# Patient Record
Sex: MALE | Race: WHITE
[De-identification: names, ages, dates, MRNs, and addresses within clinical notes are randomized per-mention and may not be internally consistent; named-entity substitution may affect disease eponyms.]

---

## 2018-08-14 ENCOUNTER — HOSPITAL ENCOUNTER (OUTPATIENT)
Age: 78
End: 2018-08-14
Payer: MEDICARE

## 2018-08-14 DIAGNOSIS — E11.9: Primary | ICD-10-CM

## 2018-08-14 DIAGNOSIS — Z79.899: ICD-10-CM

## 2018-08-14 LAB
ALBUMIN LEVEL: 3.5 GM/DL (ref 3.4–5)
ALP ISO SERPL-ACNC: 109 U/L (ref 46–116)
ALT SERPLBLD-CCNC: 27 U/L (ref 12–78)
AST SERPL QL: 17 U/L (ref 15–37)
BILIRUB DIRECT SERPL-MCNC: 0.2 MG/DL (ref 0–0.2)
BILIRUB INDIRECT SERPL-MCNC: 0.7 MG/DL (ref 0–0.9)
BILIRUBIN,TOTAL: 0.9 MG/DL (ref 0.2–1)
CHOLEST SPEC-SCNC: 100 MG/DL (ref 140–200)
FT4I SERPL CALC-MCNC: 2.8 UG/DL (ref 5.93–13.13)
HDLC SERPL-MCNC: 29 MG/DL (ref 27–67)
PROT SERPL-MCNC: 5.8 GM/DL (ref 6.4–8.2)
T3RU NFR SERPL: 36 % (ref 31–39)
T4 (THYROXINE): 7.9 UG/DL (ref 4.7–13.3)
TRIGLYCERIDES: 119 MG/DL (ref 30–200)
TSH SERPL-ACNC: 3.29 UIU/ML (ref 0.36–3.74)

## 2018-08-14 PROCEDURE — 36415 COLL VENOUS BLD VENIPUNCTURE: CPT

## 2018-08-14 PROCEDURE — 80076 HEPATIC FUNCTION PANEL: CPT

## 2018-08-14 PROCEDURE — 84436 ASSAY OF TOTAL THYROXINE: CPT

## 2018-08-14 PROCEDURE — 84479 ASSAY OF THYROID (T3 OR T4): CPT

## 2018-08-14 PROCEDURE — 84443 ASSAY THYROID STIM HORMONE: CPT

## 2018-08-14 PROCEDURE — 80061 LIPID PANEL: CPT

## 2019-03-04 ENCOUNTER — ON CAMPUS - OUTPATIENT (OUTPATIENT)
Dept: RURAL HOSPITAL 6 | Facility: HOSPITAL | Age: 79
End: 2019-03-04

## 2019-03-04 DIAGNOSIS — Z85.038 PERSONAL HISTORY OF OTHER MALIGNANT NEOPLASM OF LARGE INTEST: ICD-10-CM

## 2019-03-04 DIAGNOSIS — D12.5 BENIGN NEOPLASM OF SIGMOID COLON: ICD-10-CM

## 2019-03-04 DIAGNOSIS — K57.90 DIVERTICULOSIS OF INTESTINE, PART UNSPECIFIED, WITHOUT PERFO: ICD-10-CM

## 2019-03-04 DIAGNOSIS — D12.3 BENIGN NEOPLASM OF TRANSVERSE COLON: ICD-10-CM

## 2019-03-04 DIAGNOSIS — D12.2 BENIGN NEOPLASM OF ASCENDING COLON: ICD-10-CM

## 2019-03-04 PROCEDURE — 45385 COLONOSCOPY W/LESION REMOVAL: CPT | Mod: PT | Performed by: INTERNAL MEDICINE

## 2020-02-10 ENCOUNTER — HOSPITAL ENCOUNTER (OUTPATIENT)
Age: 80
End: 2020-02-10
Payer: MEDICARE

## 2020-02-10 DIAGNOSIS — R97.20: Primary | ICD-10-CM

## 2020-02-10 PROCEDURE — 74176 CT ABD & PELVIS W/O CONTRAST: CPT

## 2020-02-13 ENCOUNTER — HOSPITAL ENCOUNTER (OUTPATIENT)
Age: 80
End: 2020-02-13
Payer: MEDICARE

## 2020-02-13 DIAGNOSIS — R06.00: ICD-10-CM

## 2020-02-13 DIAGNOSIS — Z95.5: ICD-10-CM

## 2020-02-13 DIAGNOSIS — I25.10: ICD-10-CM

## 2020-02-13 DIAGNOSIS — E78.5: Primary | ICD-10-CM

## 2020-02-13 DIAGNOSIS — I10: ICD-10-CM

## 2020-02-13 LAB
ANION GAP SERPL CALC-SCNC: 14.2 MEQ/L (ref 5–15)
BUN SERPL-MCNC: 18 MG/DL (ref 7–18)
CALCIUM SPEC-MCNC: 8.6 MG/DL (ref 8.5–10.1)
CHLORIDE SPEC-SCNC: 111 MMOL/L (ref 98–107)
CO2 SERPL-SCNC: 28 MMOL/L (ref 21–32)
CREAT BLD-SCNC: 0.97 MG/DL (ref 0.7–1.3)
ESTIMATED GLOMERULAR FILT RATE: 75 ML/MIN (ref 60–?)
GFR (AFRICAN AMERICAN): 91 ML/MIN (ref 60–?)
GLUCOSE: 100 MG/DL (ref 74–106)
POTASSIUM: 4.2 MMOL/L (ref 3.5–5.1)
SODIUM SPEC-SCNC: 149 MMOL/L (ref 137–145)

## 2020-02-13 PROCEDURE — 83880 ASSAY OF NATRIURETIC PEPTIDE: CPT

## 2020-02-13 PROCEDURE — 80048 BASIC METABOLIC PNL TOTAL CA: CPT

## 2020-02-13 PROCEDURE — 36415 COLL VENOUS BLD VENIPUNCTURE: CPT

## 2020-02-24 ENCOUNTER — HOSPITAL ENCOUNTER (OUTPATIENT)
Age: 80
End: 2020-02-24
Payer: MEDICARE

## 2020-02-24 DIAGNOSIS — I25.10: ICD-10-CM

## 2020-02-24 DIAGNOSIS — I10: ICD-10-CM

## 2020-02-24 DIAGNOSIS — Z95.5: ICD-10-CM

## 2020-02-24 DIAGNOSIS — Z85.46: ICD-10-CM

## 2020-02-24 DIAGNOSIS — E78.5: Primary | ICD-10-CM

## 2020-02-24 PROCEDURE — 93306 TTE W/DOPPLER COMPLETE: CPT

## 2020-02-24 PROCEDURE — 78306 BONE IMAGING WHOLE BODY: CPT

## 2020-02-24 PROCEDURE — A9503 TC99M MEDRONATE: HCPCS

## 2020-02-27 ENCOUNTER — HOSPITAL ENCOUNTER (OUTPATIENT)
Age: 80
End: 2020-02-27
Payer: MEDICARE

## 2020-02-27 DIAGNOSIS — R97.20: ICD-10-CM

## 2020-02-27 DIAGNOSIS — Z85.46: Primary | ICD-10-CM

## 2020-02-27 LAB — PROSTATE SPECIFIC AG, DIAGNOST: 12.6 NG/ML (ref 0–4)

## 2020-02-27 PROCEDURE — 36415 COLL VENOUS BLD VENIPUNCTURE: CPT

## 2020-02-27 PROCEDURE — 84153 ASSAY OF PSA TOTAL: CPT

## 2020-06-12 ENCOUNTER — HOSPITAL ENCOUNTER (OUTPATIENT)
Dept: HOSPITAL 22 - CATHLAB | Age: 80
Discharge: HOME | End: 2020-06-12
Payer: MEDICARE

## 2020-06-12 VITALS
DIASTOLIC BLOOD PRESSURE: 59 MMHG | HEART RATE: 51 BPM | RESPIRATION RATE: 17 BRPM | SYSTOLIC BLOOD PRESSURE: 115 MMHG | OXYGEN SATURATION: 95 %

## 2020-06-12 VITALS
OXYGEN SATURATION: 97 % | RESPIRATION RATE: 16 BRPM | DIASTOLIC BLOOD PRESSURE: 58 MMHG | HEART RATE: 52 BPM | SYSTOLIC BLOOD PRESSURE: 119 MMHG

## 2020-06-12 VITALS
DIASTOLIC BLOOD PRESSURE: 55 MMHG | SYSTOLIC BLOOD PRESSURE: 111 MMHG | HEART RATE: 52 BPM | RESPIRATION RATE: 16 BRPM | OXYGEN SATURATION: 95 %

## 2020-06-12 VITALS
OXYGEN SATURATION: 95 % | DIASTOLIC BLOOD PRESSURE: 58 MMHG | HEART RATE: 57 BPM | RESPIRATION RATE: 20 BRPM | SYSTOLIC BLOOD PRESSURE: 127 MMHG

## 2020-06-12 VITALS
RESPIRATION RATE: 15 BRPM | DIASTOLIC BLOOD PRESSURE: 73 MMHG | OXYGEN SATURATION: 100 % | SYSTOLIC BLOOD PRESSURE: 154 MMHG | HEART RATE: 57 BPM | TEMPERATURE: 97.5 F

## 2020-06-12 VITALS
DIASTOLIC BLOOD PRESSURE: 63 MMHG | OXYGEN SATURATION: 99 % | HEART RATE: 55 BPM | RESPIRATION RATE: 16 BRPM | SYSTOLIC BLOOD PRESSURE: 124 MMHG

## 2020-06-12 VITALS
RESPIRATION RATE: 20 BRPM | HEART RATE: 54 BPM | SYSTOLIC BLOOD PRESSURE: 118 MMHG | OXYGEN SATURATION: 100 % | DIASTOLIC BLOOD PRESSURE: 56 MMHG

## 2020-06-12 VITALS
SYSTOLIC BLOOD PRESSURE: 124 MMHG | DIASTOLIC BLOOD PRESSURE: 68 MMHG | OXYGEN SATURATION: 98 % | RESPIRATION RATE: 17 BRPM | HEART RATE: 57 BPM

## 2020-06-12 VITALS
SYSTOLIC BLOOD PRESSURE: 130 MMHG | OXYGEN SATURATION: 98 % | HEART RATE: 60 BPM | RESPIRATION RATE: 20 BRPM | DIASTOLIC BLOOD PRESSURE: 65 MMHG

## 2020-06-12 VITALS — BODY MASS INDEX: 31.3 KG/M2

## 2020-06-12 VITALS
HEART RATE: 50 BPM | RESPIRATION RATE: 16 BRPM | DIASTOLIC BLOOD PRESSURE: 60 MMHG | OXYGEN SATURATION: 96 % | SYSTOLIC BLOOD PRESSURE: 118 MMHG

## 2020-06-12 VITALS
OXYGEN SATURATION: 100 % | SYSTOLIC BLOOD PRESSURE: 116 MMHG | DIASTOLIC BLOOD PRESSURE: 58 MMHG | RESPIRATION RATE: 20 BRPM | HEART RATE: 54 BPM

## 2020-06-12 DIAGNOSIS — Z79.82: ICD-10-CM

## 2020-06-12 DIAGNOSIS — I10: ICD-10-CM

## 2020-06-12 DIAGNOSIS — I25.118: Primary | ICD-10-CM

## 2020-06-12 DIAGNOSIS — E78.5: ICD-10-CM

## 2020-06-12 DIAGNOSIS — Z79.899: ICD-10-CM

## 2020-06-12 DIAGNOSIS — Z79.02: ICD-10-CM

## 2020-06-12 DIAGNOSIS — Z95.5: ICD-10-CM

## 2020-06-12 DIAGNOSIS — Z87.891: ICD-10-CM

## 2020-06-12 LAB
ANION GAP SERPL CALC-SCNC: 9.1 MEQ/L (ref 5–15)
BUN SERPL-MCNC: 21 MG/DL (ref 9–20)
CALCIUM SPEC-MCNC: 9.2 MG/DL (ref 8.4–10.2)
CHLORIDE SPEC-SCNC: 108 MMOL/L (ref 98–107)
CO2 SERPL-SCNC: 29 MMOL/L (ref 22–30)
CREAT BLD-SCNC: 1.1 MG/DL (ref 0.66–1.25)
CREATININE CLEARANCE ESTIMATED: 72 ML/MIN (ref 50–200)
ESTIMATED GLOMERULAR FILT RATE: 65 ML/MIN (ref 60–?)
GFR (AFRICAN AMERICAN): 78 ML/MIN (ref 60–?)
GLUCOSE: 134 MG/DL (ref 74–100)
HCT VFR BLD CALC: 39.7 % (ref 42–52)
HGB BLD-MCNC: 13.8 G/DL (ref 14.1–18)
MCHC RBC-ENTMCNC: 34.6 G/DL (ref 31.8–35.4)
MCV RBC: 87.3 FL (ref 80–94)
MEAN CORPUSCULAR HEMOGLOBIN: 30.2 PG (ref 27–31.2)
PLATELET # BLD: 147 K/MM3 (ref 142–424)
POTASSIUM: 4.1 MMOL/L (ref 3.5–5.1)
RBC # BLD AUTO: 4.55 M/MM3 (ref 4.6–6.2)
SODIUM SPEC-SCNC: 142 MMOL/L (ref 136–145)
WBC # BLD AUTO: 7.7 K/MM3 (ref 4.8–10.8)

## 2020-06-12 PROCEDURE — C1769 GUIDE WIRE: HCPCS

## 2020-06-12 PROCEDURE — 85025 COMPLETE CBC W/AUTO DIFF WBC: CPT

## 2020-06-12 PROCEDURE — 99152 MOD SED SAME PHYS/QHP 5/>YRS: CPT

## 2020-06-12 PROCEDURE — 80048 BASIC METABOLIC PNL TOTAL CA: CPT

## 2020-06-12 PROCEDURE — 93458 L HRT ARTERY/VENTRICLE ANGIO: CPT

## 2020-07-09 ENCOUNTER — HOSPITAL ENCOUNTER (OUTPATIENT)
Age: 80
End: 2020-07-09
Payer: MEDICARE

## 2020-07-09 DIAGNOSIS — C61: Primary | ICD-10-CM

## 2020-07-09 LAB — PROSTATE SPECIFIC AG, DIAGNOST: 1.07 NG/ML (ref 0–4)

## 2020-07-09 PROCEDURE — 36415 COLL VENOUS BLD VENIPUNCTURE: CPT

## 2020-07-09 PROCEDURE — 84153 ASSAY OF PSA TOTAL: CPT

## 2020-07-20 ENCOUNTER — HOSPITAL ENCOUNTER (OUTPATIENT)
Age: 80
End: 2020-07-20
Payer: MEDICARE

## 2020-07-20 DIAGNOSIS — R91.1: Primary | ICD-10-CM

## 2020-07-23 ENCOUNTER — HOSPITAL ENCOUNTER (OUTPATIENT)
Age: 80
End: 2020-07-23
Payer: MEDICARE

## 2020-07-23 DIAGNOSIS — R91.1: Primary | ICD-10-CM

## 2020-07-23 PROCEDURE — 71260 CT THORAX DX C+: CPT

## 2020-08-13 ENCOUNTER — HOSPITAL ENCOUNTER (OUTPATIENT)
Age: 80
End: 2020-08-13
Payer: MEDICARE

## 2020-08-13 DIAGNOSIS — R91.8: ICD-10-CM

## 2020-08-13 DIAGNOSIS — R06.00: ICD-10-CM

## 2020-08-13 DIAGNOSIS — J42: Primary | ICD-10-CM

## 2020-08-13 PROCEDURE — 87077 CULTURE AEROBIC IDENTIFY: CPT

## 2020-08-13 PROCEDURE — 87070 CULTURE OTHR SPECIMN AEROBIC: CPT

## 2020-08-13 PROCEDURE — 87205 SMEAR GRAM STAIN: CPT

## 2020-08-18 ENCOUNTER — OFFICE VISIT (OUTPATIENT)
Dept: PULMONOLOGY | Facility: CLINIC | Age: 80
End: 2020-08-18

## 2020-08-18 ENCOUNTER — NURSE NAVIGATOR (OUTPATIENT)
Dept: ONCOLOGY | Facility: CLINIC | Age: 80
End: 2020-08-18

## 2020-08-18 VITALS
DIASTOLIC BLOOD PRESSURE: 72 MMHG | OXYGEN SATURATION: 94 % | BODY MASS INDEX: 29.12 KG/M2 | HEIGHT: 71 IN | SYSTOLIC BLOOD PRESSURE: 112 MMHG | TEMPERATURE: 97.1 F | WEIGHT: 208 LBS | HEART RATE: 84 BPM

## 2020-08-18 DIAGNOSIS — R93.89 ABNORMAL CHEST CT: Primary | ICD-10-CM

## 2020-08-18 DIAGNOSIS — R91.1 LUNG NODULE: Primary | ICD-10-CM

## 2020-08-18 DIAGNOSIS — J84.10 PULMONARY FIBROSIS (HCC): ICD-10-CM

## 2020-08-18 DIAGNOSIS — C61 PROSTATE CANCER (HCC): ICD-10-CM

## 2020-08-18 PROCEDURE — 99204 OFFICE O/P NEW MOD 45 MIN: CPT | Performed by: INTERNAL MEDICINE

## 2020-08-18 RX ORDER — CHLORAL HYDRATE 500 MG
CAPSULE ORAL
COMMUNITY

## 2020-08-18 RX ORDER — ATORVASTATIN CALCIUM 40 MG/1
40 TABLET, FILM COATED ORAL DAILY
COMMUNITY

## 2020-08-18 RX ORDER — PSYLLIUM SEED (WITH DEXTROSE)
POWDER (GRAM) ORAL DAILY
COMMUNITY

## 2020-08-18 RX ORDER — PANTOPRAZOLE SODIUM 40 MG/1
40 TABLET, DELAYED RELEASE ORAL DAILY
COMMUNITY

## 2020-08-18 RX ORDER — BRIMONIDINE TARTRATE 2 MG/ML
1 SOLUTION/ DROPS OPHTHALMIC 3 TIMES DAILY
COMMUNITY

## 2020-08-18 RX ORDER — ASPIRIN 81 MG/1
81 TABLET, CHEWABLE ORAL DAILY
COMMUNITY

## 2020-08-18 RX ORDER — METFORMIN HYDROCHLORIDE 500 MG/1
500 TABLET, EXTENDED RELEASE ORAL
COMMUNITY

## 2020-08-18 RX ORDER — BISOPROLOL FUMARATE 5 MG/1
5 TABLET, FILM COATED ORAL DAILY
COMMUNITY

## 2020-08-18 RX ORDER — CLOPIDOGREL BISULFATE 75 MG/1
75 TABLET ORAL DAILY
COMMUNITY

## 2020-08-18 RX ORDER — GABAPENTIN 300 MG/1
300 CAPSULE ORAL 3 TIMES DAILY
COMMUNITY

## 2020-08-18 RX ORDER — FAMOTIDINE 20 MG/1
20 TABLET, FILM COATED ORAL 2 TIMES DAILY
COMMUNITY

## 2020-08-18 RX ORDER — LATANOPROST 50 UG/ML
1 SOLUTION/ DROPS OPHTHALMIC NIGHTLY
COMMUNITY

## 2020-08-18 NOTE — PROGRESS NOTES
New Pulmonary Patient Office Visit      Patient Name: Gerard Sheffield    Referring Physician: Rufus Hutton,*    Chief Complaint:    Chief Complaint   Patient presents with   • Lung Mass       History of Present Illness: Gerard Sheffield is a 80 y.o. male who is here today to establish care with Pulmonary.      Patient is here for initial evaluation along with his daughter.  Patient previously has history of colon cancer many years ago status post radiation and chemotherapy, prostate cancer x2 first time he received radiation treatment and second time he received hormonal therapy as recently as few months ago.  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  Patient also has history of coronary disease status post stents 3 times.  More recent coronary angiogram was apparently in February and this time did not put any stents in.  Patient has 15-pack-year smoking history in the past.  He also worked in coal mines for 30 years.    Patient recently was found to have abnormal CT scan of the chest in February where he was found to have left lower lobe lung nodule.  Subsequently had a repeat CT scan last month and this nodule/mass lesion seems to be increasing in size.  He is referred for further evaluation.  Patient states that he does cough daily is sputum is mostly whitish to yellow.  He denies any blood in his sputum.  Patient does get short of breath with exertional activity.  He denies any fevers, chills or night sweats.  Denies any weight loss and in fact he says he has gained some weight.  Denies any exposure to sick contacts.  Denies any TB exposure.  Denies any asbestos exposure either.  Denies any frequent pneumonias or infections.    Denies any hematochezia or melena.  Denies any abdominal pain.    Subjective      Review of Systems:   Review of Systems   Constitutional: Positive for fatigue and unexpected weight gain.   HENT: Negative.    Respiratory: Positive for cough and  shortness of breath.    Cardiovascular: Negative.    Gastrointestinal: Negative.    Endocrine: Negative.    Musculoskeletal: Positive for arthralgias.   Skin: Negative.    Neurological: Negative.    Hematological: Negative.    Psychiatric/Behavioral: Negative.    All other systems reviewed and are negative.      Past Medical History:   Past Medical History:   Diagnosis Date   • Colon cancer (CMS/HCC)    • Diabetes mellitus (CMS/HCC)    • Heart disease    • Prostate cancer (CMS/HCC)        Past Surgical History:   Past Surgical History:   Procedure Laterality Date   • APPENDECTOMY     • CARDIAC SURGERY     • COLON SURGERY     • GALLBLADDER SURGERY         Family History: History reviewed. No pertinent family history.    Social History:   Social History     Socioeconomic History   • Marital status:      Spouse name: Not on file   • Number of children: Not on file   • Years of education: Not on file   • Highest education level: Not on file   Tobacco Use   • Smoking status: Former Smoker     Packs/day: 1.00     Years: 10.00     Pack years: 10.00     Types: Cigarettes     Last attempt to quit: 1968     Years since quittin.6   • Smokeless tobacco: Never Used   Substance and Sexual Activity   • Alcohol use: Never     Frequency: Never   • Drug use: Never   • Sexual activity: Defer       Medications:     Current Outpatient Medications:   •  aspirin 81 MG chewable tablet, Chew 81 mg Daily., Disp: , Rfl:   •  atorvastatin (LIPITOR) 40 MG tablet, Take 40 mg by mouth Daily., Disp: , Rfl:   •  bisoprolol (ZEBeta) 5 MG tablet, Take 5 mg by mouth Daily., Disp: , Rfl:   •  brimonidine (ALPHAGAN) 0.2 % ophthalmic solution, 1 drop 3 (Three) Times a Day., Disp: , Rfl:   •  clopidogrel (PLAVIX) 75 MG tablet, Take 75 mg by mouth Daily., Disp: , Rfl:   •  dornase alpha (PULMOZYME) 1 MG/ML nebulizer solution, Inhale 2.5 mg Daily., Disp: , Rfl:   •  famotidine (PEPCID) 20 MG tablet, Take 20 mg by mouth 2 (Two) Times a Day.,  "Disp: , Rfl:   •  gabapentin (NEURONTIN) 300 MG capsule, Take 300 mg by mouth 3 (Three) Times a Day., Disp: , Rfl:   •  latanoprost (XALATAN) 0.005 % ophthalmic solution, 1 drop Every Night., Disp: , Rfl:   •  metFORMIN ER (GLUCOPHAGE-XR) 500 MG 24 hr tablet, Take 500 mg by mouth Daily With Breakfast., Disp: , Rfl:   •  Omega-3 1000 MG capsule, Take  by mouth., Disp: , Rfl:   •  pantoprazole (PROTONIX) 40 MG EC tablet, Take 40 mg by mouth Daily., Disp: , Rfl:   •  Probiotic Product (PROBIOTIC-10 PO), Take  by mouth., Disp: , Rfl:   •  Psyllium (METAMUCIL) wafer wafer, Take  by mouth Daily., Disp: , Rfl:     Allergies:   No Known Allergies    Objective     Physical Exam:  Vital Signs:   Vitals:    08/18/20 1431   BP: 112/72   Pulse: 84   Temp: 97.1 °F (36.2 °C)   SpO2: 94%  Comment: resting at room air   Weight: 94.3 kg (208 lb)   Height: 180.3 cm (71\")       Physical Exam   Constitutional: He is oriented to person, place, and time. He appears well-developed and well-nourished. No distress.   HENT:   Head: Normocephalic and atraumatic.   Nose: Nose normal.   Mouth/Throat: Oropharynx is clear and moist. No oropharyngeal exudate.   Thrush: None  Mallampati Score: 3    Eyes: Pupils are equal, round, and reactive to light. EOM are normal. Right eye exhibits no discharge. Left eye exhibits no discharge. No scleral icterus.   Neck: Neck supple. No tracheal deviation present. No thyromegaly present.   Cardiovascular: Normal rate, regular rhythm and normal heart sounds. Exam reveals no friction rub.   No murmur heard.  Pulmonary/Chest: Effort normal. No stridor. No respiratory distress. He has no wheezes. He has rales (bibasilar insp rales).   Abdominal: Soft. He exhibits no distension. There is no tenderness.   Musculoskeletal: He exhibits no edema or tenderness.   Clubbing: none   Lymphadenopathy:     He has no cervical adenopathy.   Neurological: He is alert and oriented to person, place, and time. No cranial nerve " deficit. Coordination normal.   Skin: He is not diaphoretic.   Psychiatric: He has a normal mood and affect. His behavior is normal. Judgment and thought content normal.   Nursing note and vitals reviewed.      Results Review:   Radiology report reviewed.  Actual films are not available for my review.  Looking at the report patient does have some pulmonary fibrotic changes.  Left lower lobe soft tissue mass is noted close to diaphragm where it is measuring 4.2 x 3.7 x 2.8.  Previously it was 3.7 x 3.4 x 3 cm in February 2020.      Assessment / Plan      Assessment:   Problem List Items Addressed This Visit     None      Visit Diagnoses     Abnormal chest CT    -  Primary    Pulmonary fibrosis (CMS/HCC)        Relevant Medications    dornase alpha (PULMOZYME) 1 MG/ML nebulizer solution          Plan:   1.  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  Patient states that he has been dealing with lung nodules for long time and recently they have noticed that they are increasing in size.  After our meeting was almost over he told me that he already saw pulmonologist in [place].  He was quite upset that he is seeing a pulmonologist again.  He and his daughter were under the impression that patient will be undergoing a PET scan today.  Discussed that we do not have that facility in our office but we will have to arrange for that.  Discussed the limitations of doing specialized imaging on the day of visit.  He told me that he did not even want to see another pulmonologist since he had seen 1.  We will try to figure out what happened here.    2.  Discussed with him that unfortunately I do not have his scans available for my review either.  I will need to request those from [facility] and once I review those I will be in touch with him about next steps.  In the meantime we will arrange for a PET scan to be done.  Discussed that PET scan does not tell us if the  lesion we are looking at is cancerous or infectious but it can guide us in that way.  Discussed that negative PET scan may be more meaningful than positive.    3.  Given this patient's history underlying ligament process is highly concerning.  If we need to proceed with bronchoscopy he will need to get off Plavix for 5 days at least and we will try to arrange that with his primary care physician or cardiologist.    4.  Patient does have inspiratory crackles and on CT scan does seem to have pulmonary fibrosis.  We will review the scans and see if he has any component of UIP and if he will benefit from newer anti-fibrotic treatment.    5.  Patient will need PFTs and will try to arrange for those when he comes back for follow-up.        Thank you for allowing me to participate in the care of this patient.  We will follow him closely with you.  There was some misunderstanding between his referral and he will try to discuss with his primary care provider regarding that.    Follow Up:   Will arrange PET scan and will call with report.    Discussed plan of care in detail with patient today. Patient verbally understands and agrees.      Jesse Velez MD  Pulmonary Critical Care and Sleep Medicine      Please note that portions of this note may have been completed with a voice recognition program. Efforts were made to edit the dictations, but occasionally words are mistranscribed.

## 2020-08-19 DIAGNOSIS — R93.89 ABNORMAL CHEST CT: Primary | ICD-10-CM

## 2020-08-19 NOTE — PROGRESS NOTES
Met patient and daughter in lung nodule clinic with Dr. Velez. He has remote smoking history, quitting in 1968. 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 He has history of prostate cancer, colon cancer and leukemia in his teens. He presented with chest pain in July and CT scan of the chest for that work-up incidentally noted 4.2cm LLL mass with left diaphragm thickening. Disc with images was not available at time of visit. Disc will be requested for comparison. He was seen by pulmonary doctor in [place] and referred here by that physician. Patient was under the impression he was coming to [place] today for PET scan and became upset when he discovered he would not have that done today. Per Dr. Velez PET now. Introduced lung navigator role and provided contact information. He v/u and agreeable to plan.

## 2020-08-21 DIAGNOSIS — R91.1 LUNG NODULE: Primary | ICD-10-CM

## 2020-08-25 DIAGNOSIS — Z00.6 EXAMINATION FOR NORMAL COMPARISON FOR CLINICAL RESEARCH: Primary | ICD-10-CM

## 2020-09-03 ENCOUNTER — APPOINTMENT (OUTPATIENT)
Dept: PET IMAGING | Facility: HOSPITAL | Age: 80
End: 2020-09-03

## 2020-09-03 ENCOUNTER — HOSPITAL ENCOUNTER (OUTPATIENT)
Dept: PET IMAGING | Facility: HOSPITAL | Age: 80
Discharge: HOME | End: 2020-09-03

## 2020-09-03 ENCOUNTER — HOSPITAL ENCOUNTER (OUTPATIENT)
Dept: PET IMAGING | Facility: HOSPITAL | Age: 80
Discharge: HOME | End: 2020-09-03
Admitting: INTERNAL MEDICINE

## 2020-09-03 DIAGNOSIS — R91.1 LUNG NODULE: ICD-10-CM

## 2020-09-03 LAB — GLUCOSE BLDC GLUCOMTR-MCNC: 110 MG/DL (ref 70–130)

## 2020-09-03 PROCEDURE — 0 FLUDEOXYGLUCOSE F18 SOLUTION: Performed by: INTERNAL MEDICINE

## 2020-09-03 PROCEDURE — 82962 GLUCOSE BLOOD TEST: CPT

## 2020-09-03 PROCEDURE — 78815 PET IMAGE W/CT SKULL-THIGH: CPT

## 2020-09-03 PROCEDURE — A9552 F18 FDG: HCPCS | Performed by: INTERNAL MEDICINE

## 2020-09-03 RX ADMIN — FLUDEOXYGLUCOSE F18 1 DOSE: 300 INJECTION INTRAVENOUS at 11:42

## 2020-10-12 ENCOUNTER — HOSPITAL ENCOUNTER (OUTPATIENT)
Age: 80
End: 2020-10-12
Payer: MEDICARE

## 2020-10-12 DIAGNOSIS — C61: Primary | ICD-10-CM

## 2020-10-12 LAB — PROSTATE SPECIFIC AG, DIAGNOST: 0.54 NG/ML (ref 0–4)

## 2020-10-12 PROCEDURE — 84153 ASSAY OF PSA TOTAL: CPT

## 2020-10-12 PROCEDURE — 36415 COLL VENOUS BLD VENIPUNCTURE: CPT

## 2021-04-12 ENCOUNTER — HOSPITAL ENCOUNTER (OUTPATIENT)
Age: 81
End: 2021-04-12
Payer: MEDICARE

## 2021-04-12 DIAGNOSIS — C61: Primary | ICD-10-CM

## 2021-04-12 LAB — PROSTATE SPECIFIC AG, DIAGNOST: 0.28 NG/ML (ref 0–4)

## 2021-04-12 PROCEDURE — 84153 ASSAY OF PSA TOTAL: CPT

## 2021-07-12 ENCOUNTER — HOSPITAL ENCOUNTER (OUTPATIENT)
Age: 81
End: 2021-07-12
Payer: MEDICARE

## 2021-07-12 DIAGNOSIS — R91.1: Primary | ICD-10-CM

## 2021-07-12 LAB
BUN SERPL-MCNC: 21 MG/DL (ref 9–20)
CREAT BLD-SCNC: 1 MG/DL (ref 0.66–1.25)
ESTIMATED GLOMERULAR FILT RATE: 72 ML/MIN (ref 60–?)
GFR (AFRICAN AMERICAN): 87 ML/MIN (ref 60–?)

## 2021-07-12 PROCEDURE — 71260 CT THORAX DX C+: CPT

## 2021-07-12 PROCEDURE — 84520 ASSAY OF UREA NITROGEN: CPT

## 2021-07-12 PROCEDURE — 36415 COLL VENOUS BLD VENIPUNCTURE: CPT

## 2021-07-12 PROCEDURE — 82565 ASSAY OF CREATININE: CPT

## 2021-10-12 ENCOUNTER — HOSPITAL ENCOUNTER (OUTPATIENT)
Age: 81
End: 2021-10-12
Payer: MEDICARE

## 2021-10-12 DIAGNOSIS — C61: Primary | ICD-10-CM

## 2021-10-12 LAB — PROSTATE SPECIFIC AG, DIAGNOST: 0.39 NG/ML (ref 0–4)

## 2021-10-12 PROCEDURE — 36415 COLL VENOUS BLD VENIPUNCTURE: CPT

## 2021-10-12 PROCEDURE — 84153 ASSAY OF PSA TOTAL: CPT

## 2021-11-21 ENCOUNTER — HOSPITAL ENCOUNTER (INPATIENT)
Dept: HOSPITAL 22 - ER | Age: 81
LOS: 8 days | Discharge: HOME HEALTH | DRG: 177 | End: 2021-11-29
Payer: MEDICARE

## 2021-11-21 VITALS
DIASTOLIC BLOOD PRESSURE: 51 MMHG | RESPIRATION RATE: 32 BRPM | SYSTOLIC BLOOD PRESSURE: 133 MMHG | OXYGEN SATURATION: 95 % | HEART RATE: 94 BPM

## 2021-11-21 VITALS
TEMPERATURE: 98.96 F | HEART RATE: 74 BPM | DIASTOLIC BLOOD PRESSURE: 71 MMHG | SYSTOLIC BLOOD PRESSURE: 127 MMHG | RESPIRATION RATE: 20 BRPM | OXYGEN SATURATION: 96 %

## 2021-11-21 VITALS
OXYGEN SATURATION: 96 % | RESPIRATION RATE: 20 BRPM | HEART RATE: 80 BPM | DIASTOLIC BLOOD PRESSURE: 43 MMHG | SYSTOLIC BLOOD PRESSURE: 136 MMHG | TEMPERATURE: 99 F

## 2021-11-21 VITALS
BODY MASS INDEX: 30.1 KG/M2 | BODY MASS INDEX: 30.2 KG/M2 | BODY MASS INDEX: 30.7 KG/M2 | BODY MASS INDEX: 28 KG/M2 | BODY MASS INDEX: 29.2 KG/M2 | BODY MASS INDEX: 30.4 KG/M2 | BODY MASS INDEX: 29.1 KG/M2

## 2021-11-21 VITALS
SYSTOLIC BLOOD PRESSURE: 129 MMHG | RESPIRATION RATE: 28 BRPM | HEART RATE: 95 BPM | DIASTOLIC BLOOD PRESSURE: 59 MMHG | OXYGEN SATURATION: 87 %

## 2021-11-21 VITALS
OXYGEN SATURATION: 96 % | HEART RATE: 78 BPM | RESPIRATION RATE: 28 BRPM | SYSTOLIC BLOOD PRESSURE: 142 MMHG | TEMPERATURE: 98 F | DIASTOLIC BLOOD PRESSURE: 78 MMHG

## 2021-11-21 VITALS
DIASTOLIC BLOOD PRESSURE: 68 MMHG | RESPIRATION RATE: 24 BRPM | OXYGEN SATURATION: 96 % | TEMPERATURE: 99.86 F | SYSTOLIC BLOOD PRESSURE: 124 MMHG | HEART RATE: 88 BPM

## 2021-11-21 VITALS
SYSTOLIC BLOOD PRESSURE: 127 MMHG | HEART RATE: 104 BPM | RESPIRATION RATE: 28 BRPM | DIASTOLIC BLOOD PRESSURE: 63 MMHG | TEMPERATURE: 99.2 F | OXYGEN SATURATION: 71 %

## 2021-11-21 VITALS — OXYGEN SATURATION: 94 %

## 2021-11-21 VITALS
DIASTOLIC BLOOD PRESSURE: 57 MMHG | RESPIRATION RATE: 20 BRPM | SYSTOLIC BLOOD PRESSURE: 133 MMHG | OXYGEN SATURATION: 94 % | TEMPERATURE: 99.1 F | HEART RATE: 90 BPM

## 2021-11-21 VITALS — OXYGEN SATURATION: 96 %

## 2021-11-21 VITALS — HEART RATE: 80 BPM

## 2021-11-21 DIAGNOSIS — I25.10: ICD-10-CM

## 2021-11-21 DIAGNOSIS — J12.82: ICD-10-CM

## 2021-11-21 DIAGNOSIS — U07.1: Primary | ICD-10-CM

## 2021-11-21 DIAGNOSIS — J44.0: ICD-10-CM

## 2021-11-21 DIAGNOSIS — Z95.5: ICD-10-CM

## 2021-11-21 DIAGNOSIS — E78.2: ICD-10-CM

## 2021-11-21 DIAGNOSIS — J96.01: ICD-10-CM

## 2021-11-21 DIAGNOSIS — Z79.84: ICD-10-CM

## 2021-11-21 DIAGNOSIS — Z87.891: ICD-10-CM

## 2021-11-21 DIAGNOSIS — E11.9: ICD-10-CM

## 2021-11-21 DIAGNOSIS — R91.8: ICD-10-CM

## 2021-11-21 DIAGNOSIS — I25.2: ICD-10-CM

## 2021-11-21 DIAGNOSIS — M19.90: ICD-10-CM

## 2021-11-21 DIAGNOSIS — I10: ICD-10-CM

## 2021-11-21 LAB
ALBUMIN LEVEL: 3.8 G/DL (ref 3.5–5)
ALBUMIN/GLOB SERPL: 1.3 {RATIO} (ref 1.1–1.8)
ALP ISO SERPL-ACNC: 103 U/L (ref 38–126)
ALT SERPLBLD-CCNC: 40 U/L (ref 12–78)
ANION GAP SERPL CALC-SCNC: 11.8 MEQ/L (ref 5–15)
AST SERPL QL: 75 U/L (ref 17–59)
BILIRUBIN,TOTAL: 1.1 MG/DL (ref 0.2–1.3)
BUN SERPL-MCNC: 26 MG/DL (ref 9–20)
CALCIUM SPEC-MCNC: 8.9 MG/DL (ref 8.4–10.2)
CHLORIDE SPEC-SCNC: 103 MMOL/L (ref 98–107)
CK SERPL-CCNC: 303 U/L (ref 55–170)
CO2 SERPL-SCNC: 27 MMOL/L (ref 22–30)
CREAT BLD-SCNC: 1.4 MG/DL (ref 0.66–1.25)
CREATININE CLEARANCE ESTIMATED: 59 ML/MIN (ref 50–200)
CRP SERPL HS-MCNC: 303.4 MG/L (ref 0–4)
D-DIMER: 0.93 UG/ML (ref 0–0.5)
ESTIMATED GLOMERULAR FILT RATE: 49 ML/MIN (ref 60–?)
GFR (AFRICAN AMERICAN): 59 ML/MIN (ref 60–?)
GLOBULIN SER CALC-MCNC: 3 G/DL (ref 1.3–3.2)
GLUCOSE BLDC GLUCOMTR-MCNC: 257 MG/DL (ref 70–110)
GLUCOSE BLDC GLUCOMTR-MCNC: 275 MG/DL (ref 70–110)
GLUCOSE: 264 MG/DL (ref 74–100)
HCT VFR BLD CALC: 37.9 % (ref 42–52)
HGB BLD-MCNC: 12.2 G/DL (ref 14.1–18)
LACTATE SERPL-MCNC: 1.6 MMOL/L (ref 0.7–2.1)
MCHC RBC-ENTMCNC: 32.1 G/DL (ref 31.8–35.4)
MCV RBC: 89.2 FL (ref 80–94)
MEAN CORPUSCULAR HEMOGLOBIN: 28.6 PG (ref 27–31.2)
PLATELET # BLD: 221 K/MM3 (ref 142–424)
POTASSIUM: 4.8 MMOL/L (ref 3.5–5.1)
PROT SERPL-MCNC: 6.8 G/DL (ref 6.3–8.2)
RBC # BLD AUTO: 4.25 M/MM3 (ref 4.6–6.2)
REFLEX LACTIC: (no result)
SODIUM SPEC-SCNC: 137 MMOL/L (ref 136–145)
TROPONIN I: 0.02 NG/ML (ref 0–0.03)
WBC # BLD AUTO: 5 K/MM3 (ref 4.8–10.8)

## 2021-11-21 PROCEDURE — 80053 COMPREHEN METABOLIC PANEL: CPT

## 2021-11-21 PROCEDURE — 85378 FIBRIN DEGRADE SEMIQUANT: CPT

## 2021-11-21 PROCEDURE — 99285 EMERGENCY DEPT VISIT HI MDM: CPT

## 2021-11-21 PROCEDURE — 82962 GLUCOSE BLOOD TEST: CPT

## 2021-11-21 PROCEDURE — 87040 BLOOD CULTURE FOR BACTERIA: CPT

## 2021-11-21 PROCEDURE — C9803 HOPD COVID-19 SPEC COLLECT: HCPCS

## 2021-11-21 PROCEDURE — 86140 C-REACTIVE PROTEIN: CPT

## 2021-11-21 PROCEDURE — U0005 INFEC AGEN DETEC AMPLI PROBE: HCPCS

## 2021-11-21 PROCEDURE — 85025 COMPLETE CBC W/AUTO DIFF WBC: CPT

## 2021-11-21 PROCEDURE — 36415 COLL VENOUS BLD VENIPUNCTURE: CPT

## 2021-11-21 PROCEDURE — 71275 CT ANGIOGRAPHY CHEST: CPT

## 2021-11-21 PROCEDURE — 96365 THER/PROPH/DIAG IV INF INIT: CPT

## 2021-11-21 PROCEDURE — 71045 X-RAY EXAM CHEST 1 VIEW: CPT

## 2021-11-21 PROCEDURE — 83615 LACTATE (LD) (LDH) ENZYME: CPT

## 2021-11-21 PROCEDURE — 93005 ELECTROCARDIOGRAM TRACING: CPT

## 2021-11-21 PROCEDURE — 86328 IA NFCT AB SARSCOV2 COVID19: CPT

## 2021-11-21 PROCEDURE — U0003 INFECTIOUS AGENT DETECTION BY NUCLEIC ACID (DNA OR RNA); SEVERE ACUTE RESPIRATORY SYNDROME CORONAVIRUS 2 (SARS-COV-2) (CORONAVIRUS DISEASE [COVID-19]), AMPLIFIED PROBE TECHNIQUE, MAKING USE OF HIGH THROUGHPUT TECHNOLOGIES AS DESCRIBED BY CMS-2020-01-R: HCPCS

## 2021-11-21 PROCEDURE — 94640 AIRWAY INHALATION TREATMENT: CPT

## 2021-11-21 PROCEDURE — 96375 TX/PRO/DX INJ NEW DRUG ADDON: CPT

## 2021-11-21 PROCEDURE — 94761 N-INVAS EAR/PLS OXIMETRY MLT: CPT

## 2021-11-21 PROCEDURE — 87205 SMEAR GRAM STAIN: CPT

## 2021-11-21 PROCEDURE — 87070 CULTURE OTHR SPECIMN AEROBIC: CPT

## 2021-11-21 PROCEDURE — 82550 ASSAY OF CK (CPK): CPT

## 2021-11-21 PROCEDURE — 83605 ASSAY OF LACTIC ACID: CPT

## 2021-11-21 PROCEDURE — 84484 ASSAY OF TROPONIN QUANT: CPT

## 2021-11-22 VITALS — HEART RATE: 74 BPM | OXYGEN SATURATION: 97 %

## 2021-11-22 VITALS
HEART RATE: 72 BPM | SYSTOLIC BLOOD PRESSURE: 134 MMHG | OXYGEN SATURATION: 97 % | DIASTOLIC BLOOD PRESSURE: 60 MMHG | TEMPERATURE: 98 F | RESPIRATION RATE: 22 BRPM

## 2021-11-22 VITALS
DIASTOLIC BLOOD PRESSURE: 54 MMHG | HEART RATE: 80 BPM | RESPIRATION RATE: 20 BRPM | TEMPERATURE: 98.9 F | SYSTOLIC BLOOD PRESSURE: 127 MMHG | OXYGEN SATURATION: 95 %

## 2021-11-22 VITALS — OXYGEN SATURATION: 92 % | HEART RATE: 77 BPM

## 2021-11-22 VITALS
OXYGEN SATURATION: 95 % | TEMPERATURE: 97.88 F | RESPIRATION RATE: 20 BRPM | SYSTOLIC BLOOD PRESSURE: 147 MMHG | HEART RATE: 78 BPM | DIASTOLIC BLOOD PRESSURE: 72 MMHG

## 2021-11-22 VITALS
OXYGEN SATURATION: 96 % | HEART RATE: 60 BPM | TEMPERATURE: 99 F | DIASTOLIC BLOOD PRESSURE: 68 MMHG | RESPIRATION RATE: 20 BRPM | SYSTOLIC BLOOD PRESSURE: 130 MMHG

## 2021-11-22 VITALS — HEART RATE: 77 BPM

## 2021-11-22 VITALS — HEART RATE: 74 BPM

## 2021-11-22 VITALS
HEART RATE: 72 BPM | SYSTOLIC BLOOD PRESSURE: 156 MMHG | OXYGEN SATURATION: 94 % | RESPIRATION RATE: 20 BRPM | DIASTOLIC BLOOD PRESSURE: 81 MMHG | TEMPERATURE: 98.4 F

## 2021-11-22 VITALS — HEART RATE: 70 BPM

## 2021-11-22 VITALS — OXYGEN SATURATION: 96 %

## 2021-11-22 LAB
ALBUMIN LEVEL: 3.2 G/DL (ref 3.5–5)
ALBUMIN/GLOB SERPL: 1.2 {RATIO} (ref 1.1–1.8)
ALP ISO SERPL-ACNC: 84 U/L (ref 38–126)
ALT SERPLBLD-CCNC: 35 U/L (ref 12–78)
ANION GAP SERPL CALC-SCNC: 8.6 MEQ/L (ref 5–15)
AST SERPL QL: 70 U/L (ref 17–59)
BILIRUBIN,TOTAL: 0.6 MG/DL (ref 0.2–1.3)
BUN SERPL-MCNC: 26 MG/DL (ref 9–20)
CALCIUM SPEC-MCNC: 8.6 MG/DL (ref 8.4–10.2)
CHLORIDE SPEC-SCNC: 108 MMOL/L (ref 98–107)
CO2 SERPL-SCNC: 29 MMOL/L (ref 22–30)
CREAT BLD-SCNC: 1.1 MG/DL (ref 0.66–1.25)
CREATININE CLEARANCE ESTIMATED: 75 ML/MIN (ref 50–200)
ESTIMATED GLOMERULAR FILT RATE: 64 ML/MIN (ref 60–?)
GFR (AFRICAN AMERICAN): 78 ML/MIN (ref 60–?)
GLOBULIN SER CALC-MCNC: 2.7 G/DL (ref 1.3–3.2)
GLUCOSE BLDC GLUCOMTR-MCNC: 205 MG/DL (ref 70–110)
GLUCOSE: 223 MG/DL (ref 74–100)
POTASSIUM: 4.6 MMOL/L (ref 3.5–5.1)
PROT SERPL-MCNC: 5.9 G/DL (ref 6.3–8.2)
SODIUM SPEC-SCNC: 141 MMOL/L (ref 136–145)

## 2021-11-23 VITALS
DIASTOLIC BLOOD PRESSURE: 66 MMHG | OXYGEN SATURATION: 94 % | SYSTOLIC BLOOD PRESSURE: 144 MMHG | TEMPERATURE: 98.3 F | HEART RATE: 71 BPM | RESPIRATION RATE: 22 BRPM

## 2021-11-23 VITALS
OXYGEN SATURATION: 92 % | HEART RATE: 74 BPM | RESPIRATION RATE: 22 BRPM | SYSTOLIC BLOOD PRESSURE: 146 MMHG | TEMPERATURE: 97.4 F | DIASTOLIC BLOOD PRESSURE: 79 MMHG

## 2021-11-23 VITALS — OXYGEN SATURATION: 90 % | HEART RATE: 89 BPM

## 2021-11-23 VITALS
SYSTOLIC BLOOD PRESSURE: 133 MMHG | DIASTOLIC BLOOD PRESSURE: 73 MMHG | RESPIRATION RATE: 20 BRPM | HEART RATE: 76 BPM | TEMPERATURE: 97.9 F | OXYGEN SATURATION: 91 %

## 2021-11-23 VITALS
HEART RATE: 75 BPM | RESPIRATION RATE: 18 BRPM | OXYGEN SATURATION: 96 % | DIASTOLIC BLOOD PRESSURE: 69 MMHG | TEMPERATURE: 97.52 F | SYSTOLIC BLOOD PRESSURE: 130 MMHG

## 2021-11-23 VITALS — HEART RATE: 70 BPM | OXYGEN SATURATION: 94 %

## 2021-11-23 VITALS
TEMPERATURE: 98.42 F | RESPIRATION RATE: 20 BRPM | SYSTOLIC BLOOD PRESSURE: 120 MMHG | HEART RATE: 74 BPM | DIASTOLIC BLOOD PRESSURE: 60 MMHG | OXYGEN SATURATION: 96 %

## 2021-11-23 VITALS
SYSTOLIC BLOOD PRESSURE: 133 MMHG | DIASTOLIC BLOOD PRESSURE: 71 MMHG | TEMPERATURE: 97.3 F | HEART RATE: 74 BPM | OXYGEN SATURATION: 91 % | RESPIRATION RATE: 20 BRPM

## 2021-11-23 VITALS — OXYGEN SATURATION: 94 % | HEART RATE: 76 BPM

## 2021-11-23 VITALS — HEART RATE: 72 BPM | OXYGEN SATURATION: 94 %

## 2021-11-23 VITALS
TEMPERATURE: 98 F | DIASTOLIC BLOOD PRESSURE: 73 MMHG | OXYGEN SATURATION: 92 % | HEART RATE: 78 BPM | RESPIRATION RATE: 18 BRPM | SYSTOLIC BLOOD PRESSURE: 132 MMHG

## 2021-11-23 VITALS — HEART RATE: 86 BPM | OXYGEN SATURATION: 92 %

## 2021-11-23 LAB
ALBUMIN LEVEL: 3 G/DL (ref 3.5–5)
ALBUMIN/GLOB SERPL: 1.1 {RATIO} (ref 1.1–1.8)
ALP ISO SERPL-ACNC: 84 U/L (ref 38–126)
ALT SERPLBLD-CCNC: 39 U/L (ref 12–78)
ANION GAP SERPL CALC-SCNC: 11.6 MEQ/L (ref 5–15)
AST SERPL QL: 65 U/L (ref 17–59)
BILIRUBIN,TOTAL: 0.6 MG/DL (ref 0.2–1.3)
BUN SERPL-MCNC: 25 MG/DL (ref 9–20)
CALCIUM SPEC-MCNC: 8.7 MG/DL (ref 8.4–10.2)
CHLORIDE SPEC-SCNC: 111 MMOL/L (ref 98–107)
CO2 SERPL-SCNC: 24 MMOL/L (ref 22–30)
CREAT BLD-SCNC: 1 MG/DL (ref 0.66–1.25)
CREATININE CLEARANCE ESTIMATED: 82 ML/MIN (ref 50–200)
ESTIMATED GLOMERULAR FILT RATE: 72 ML/MIN (ref 60–?)
GFR (AFRICAN AMERICAN): 87 ML/MIN (ref 60–?)
GLOBULIN SER CALC-MCNC: 2.7 G/DL (ref 1.3–3.2)
GLUCOSE BLDC GLUCOMTR-MCNC: 226 MG/DL (ref 70–110)
GLUCOSE BLDC GLUCOMTR-MCNC: 229 MG/DL (ref 70–110)
GLUCOSE BLDC GLUCOMTR-MCNC: 252 MG/DL (ref 70–110)
GLUCOSE BLDC GLUCOMTR-MCNC: 263 MG/DL (ref 70–110)
GLUCOSE: 221 MG/DL (ref 74–100)
POTASSIUM: 4.6 MMOL/L (ref 3.5–5.1)
PROT SERPL-MCNC: 5.7 G/DL (ref 6.3–8.2)
SODIUM SPEC-SCNC: 142 MMOL/L (ref 136–145)

## 2021-11-24 VITALS
SYSTOLIC BLOOD PRESSURE: 118 MMHG | DIASTOLIC BLOOD PRESSURE: 60 MMHG | HEART RATE: 72 BPM | TEMPERATURE: 98.42 F | OXYGEN SATURATION: 89 % | RESPIRATION RATE: 21 BRPM

## 2021-11-24 VITALS — OXYGEN SATURATION: 91 % | HEART RATE: 79 BPM

## 2021-11-24 VITALS
SYSTOLIC BLOOD PRESSURE: 121 MMHG | HEART RATE: 70 BPM | OXYGEN SATURATION: 88 % | RESPIRATION RATE: 19 BRPM | TEMPERATURE: 98.06 F | DIASTOLIC BLOOD PRESSURE: 63 MMHG

## 2021-11-24 VITALS
RESPIRATION RATE: 20 BRPM | OXYGEN SATURATION: 90 % | HEART RATE: 64 BPM | TEMPERATURE: 98.2 F | DIASTOLIC BLOOD PRESSURE: 60 MMHG | SYSTOLIC BLOOD PRESSURE: 122 MMHG

## 2021-11-24 VITALS — OXYGEN SATURATION: 95 % | HEART RATE: 64 BPM

## 2021-11-24 VITALS
HEART RATE: 80 BPM | SYSTOLIC BLOOD PRESSURE: 136 MMHG | RESPIRATION RATE: 18 BRPM | OXYGEN SATURATION: 91 % | TEMPERATURE: 98.24 F | DIASTOLIC BLOOD PRESSURE: 57 MMHG

## 2021-11-24 VITALS
OXYGEN SATURATION: 92 % | HEART RATE: 61 BPM | DIASTOLIC BLOOD PRESSURE: 66 MMHG | RESPIRATION RATE: 19 BRPM | TEMPERATURE: 98.42 F | SYSTOLIC BLOOD PRESSURE: 138 MMHG

## 2021-11-24 VITALS — HEART RATE: 60 BPM

## 2021-11-24 VITALS — HEART RATE: 63 BPM | OXYGEN SATURATION: 92 %

## 2021-11-24 VITALS — HEART RATE: 66 BPM

## 2021-11-24 LAB
ALBUMIN LEVEL: 2.7 G/DL (ref 3.5–5)
ALBUMIN/GLOB SERPL: 1 {RATIO} (ref 1.1–1.8)
ALP ISO SERPL-ACNC: 70 U/L (ref 38–126)
ALT SERPLBLD-CCNC: 33 U/L (ref 12–78)
ANION GAP SERPL CALC-SCNC: 7.5 MEQ/L (ref 5–15)
AST SERPL QL: 52 U/L (ref 17–59)
BILIRUBIN,TOTAL: 0.5 MG/DL (ref 0.2–1.3)
BUN SERPL-MCNC: 26 MG/DL (ref 9–20)
CALCIUM SPEC-MCNC: 8.4 MG/DL (ref 8.4–10.2)
CHLORIDE SPEC-SCNC: 111 MMOL/L (ref 98–107)
CO2 SERPL-SCNC: 27 MMOL/L (ref 22–30)
CREAT BLD-SCNC: 1 MG/DL (ref 0.66–1.25)
CREATININE CLEARANCE ESTIMATED: 82 ML/MIN (ref 50–200)
ESTIMATED GLOMERULAR FILT RATE: 72 ML/MIN (ref 60–?)
GFR (AFRICAN AMERICAN): 87 ML/MIN (ref 60–?)
GLOBULIN SER CALC-MCNC: 2.6 G/DL (ref 1.3–3.2)
GLUCOSE BLDC GLUCOMTR-MCNC: 166 MG/DL (ref 70–110)
GLUCOSE BLDC GLUCOMTR-MCNC: 169 MG/DL (ref 70–110)
GLUCOSE BLDC GLUCOMTR-MCNC: 195 MG/DL (ref 70–110)
GLUCOSE BLDC GLUCOMTR-MCNC: 213 MG/DL (ref 70–110)
GLUCOSE: 154 MG/DL (ref 74–100)
POTASSIUM: 4.5 MMOL/L (ref 3.5–5.1)
PROT SERPL-MCNC: 5.3 G/DL (ref 6.3–8.2)
SODIUM SPEC-SCNC: 141 MMOL/L (ref 136–145)

## 2021-11-25 VITALS
SYSTOLIC BLOOD PRESSURE: 114 MMHG | HEART RATE: 75 BPM | TEMPERATURE: 98.4 F | RESPIRATION RATE: 22 BRPM | DIASTOLIC BLOOD PRESSURE: 57 MMHG | OXYGEN SATURATION: 94 %

## 2021-11-25 VITALS — OXYGEN SATURATION: 91 % | HEART RATE: 57 BPM

## 2021-11-25 VITALS
TEMPERATURE: 98.42 F | HEART RATE: 78 BPM | OXYGEN SATURATION: 89 % | DIASTOLIC BLOOD PRESSURE: 58 MMHG | SYSTOLIC BLOOD PRESSURE: 126 MMHG | RESPIRATION RATE: 20 BRPM

## 2021-11-25 VITALS
SYSTOLIC BLOOD PRESSURE: 111 MMHG | TEMPERATURE: 97.34 F | OXYGEN SATURATION: 92 % | RESPIRATION RATE: 22 BRPM | DIASTOLIC BLOOD PRESSURE: 55 MMHG | HEART RATE: 70 BPM

## 2021-11-25 VITALS
RESPIRATION RATE: 19 BRPM | OXYGEN SATURATION: 90 % | HEART RATE: 62 BPM | SYSTOLIC BLOOD PRESSURE: 121 MMHG | DIASTOLIC BLOOD PRESSURE: 57 MMHG | TEMPERATURE: 97.8 F

## 2021-11-25 VITALS — HEART RATE: 71 BPM | OXYGEN SATURATION: 94 %

## 2021-11-25 VITALS — HEART RATE: 73 BPM | OXYGEN SATURATION: 87 %

## 2021-11-25 VITALS
HEART RATE: 50 BPM | DIASTOLIC BLOOD PRESSURE: 59 MMHG | TEMPERATURE: 98.42 F | SYSTOLIC BLOOD PRESSURE: 130 MMHG | OXYGEN SATURATION: 88 % | RESPIRATION RATE: 17 BRPM

## 2021-11-25 VITALS
SYSTOLIC BLOOD PRESSURE: 116 MMHG | DIASTOLIC BLOOD PRESSURE: 66 MMHG | OXYGEN SATURATION: 90 % | RESPIRATION RATE: 22 BRPM | HEART RATE: 64 BPM | TEMPERATURE: 98.78 F

## 2021-11-25 VITALS — HEART RATE: 50 BPM

## 2021-11-25 VITALS — HEART RATE: 71 BPM

## 2021-11-25 LAB
ALBUMIN LEVEL: 2.8 G/DL (ref 3.5–5)
ALBUMIN/GLOB SERPL: 1.1 {RATIO} (ref 1.1–1.8)
ALP ISO SERPL-ACNC: 73 U/L (ref 38–126)
ALT SERPLBLD-CCNC: 37 U/L (ref 12–78)
ANION GAP SERPL CALC-SCNC: 9.2 MEQ/L (ref 5–15)
AST SERPL QL: 52 U/L (ref 17–59)
BILIRUBIN,TOTAL: 0.6 MG/DL (ref 0.2–1.3)
BUN SERPL-MCNC: 25 MG/DL (ref 9–20)
CALCIUM SPEC-MCNC: 8.6 MG/DL (ref 8.4–10.2)
CHLORIDE SPEC-SCNC: 109 MMOL/L (ref 98–107)
CO2 SERPL-SCNC: 27 MMOL/L (ref 22–30)
CREAT BLD-SCNC: 1 MG/DL (ref 0.66–1.25)
CREATININE CLEARANCE ESTIMATED: 81 ML/MIN (ref 50–200)
ESTIMATED GLOMERULAR FILT RATE: 72 ML/MIN (ref 60–?)
GFR (AFRICAN AMERICAN): 87 ML/MIN (ref 60–?)
GLOBULIN SER CALC-MCNC: 2.6 G/DL (ref 1.3–3.2)
GLUCOSE BLDC GLUCOMTR-MCNC: 137 MG/DL (ref 70–110)
GLUCOSE BLDC GLUCOMTR-MCNC: 176 MG/DL (ref 70–110)
GLUCOSE BLDC GLUCOMTR-MCNC: 196 MG/DL (ref 70–110)
GLUCOSE BLDC GLUCOMTR-MCNC: 295 MG/DL (ref 70–110)
GLUCOSE: 125 MG/DL (ref 74–100)
HCT VFR BLD CALC: 33.3 % (ref 42–52)
HGB BLD-MCNC: 11 G/DL (ref 14.1–18)
MCHC RBC-ENTMCNC: 32.9 G/DL (ref 31.8–35.4)
MCV RBC: 87.5 FL (ref 80–94)
MEAN CORPUSCULAR HEMOGLOBIN: 28.8 PG (ref 27–31.2)
PLATELET # BLD: 288 K/MM3 (ref 142–424)
POTASSIUM: 4.2 MMOL/L (ref 3.5–5.1)
PROT SERPL-MCNC: 5.4 G/DL (ref 6.3–8.2)
RBC # BLD AUTO: 3.81 M/MM3 (ref 4.6–6.2)
SODIUM SPEC-SCNC: 141 MMOL/L (ref 136–145)
WBC # BLD AUTO: 7.8 K/MM3 (ref 4.8–10.8)

## 2021-11-26 VITALS
HEART RATE: 87 BPM | SYSTOLIC BLOOD PRESSURE: 122 MMHG | TEMPERATURE: 97.7 F | OXYGEN SATURATION: 95 % | DIASTOLIC BLOOD PRESSURE: 63 MMHG | RESPIRATION RATE: 18 BRPM

## 2021-11-26 VITALS
OXYGEN SATURATION: 89 % | SYSTOLIC BLOOD PRESSURE: 103 MMHG | RESPIRATION RATE: 22 BRPM | DIASTOLIC BLOOD PRESSURE: 69 MMHG | HEART RATE: 87 BPM | TEMPERATURE: 98.42 F

## 2021-11-26 VITALS
HEART RATE: 70 BPM | OXYGEN SATURATION: 91 % | TEMPERATURE: 98 F | SYSTOLIC BLOOD PRESSURE: 107 MMHG | DIASTOLIC BLOOD PRESSURE: 65 MMHG | RESPIRATION RATE: 18 BRPM

## 2021-11-26 VITALS
OXYGEN SATURATION: 92 % | RESPIRATION RATE: 18 BRPM | DIASTOLIC BLOOD PRESSURE: 60 MMHG | TEMPERATURE: 98.24 F | SYSTOLIC BLOOD PRESSURE: 109 MMHG | HEART RATE: 80 BPM

## 2021-11-26 VITALS
SYSTOLIC BLOOD PRESSURE: 131 MMHG | OXYGEN SATURATION: 86 % | DIASTOLIC BLOOD PRESSURE: 62 MMHG | TEMPERATURE: 97.52 F | RESPIRATION RATE: 22 BRPM | HEART RATE: 65 BPM

## 2021-11-26 VITALS
TEMPERATURE: 99.6 F | OXYGEN SATURATION: 92 % | RESPIRATION RATE: 20 BRPM | DIASTOLIC BLOOD PRESSURE: 63 MMHG | HEART RATE: 78 BPM | SYSTOLIC BLOOD PRESSURE: 119 MMHG

## 2021-11-26 VITALS — OXYGEN SATURATION: 91 % | HEART RATE: 87 BPM

## 2021-11-26 VITALS — HEART RATE: 75 BPM | OXYGEN SATURATION: 90 %

## 2021-11-26 VITALS — OXYGEN SATURATION: 86 % | HEART RATE: 65 BPM

## 2021-11-26 VITALS — HEART RATE: 90 BPM | OXYGEN SATURATION: 93 %

## 2021-11-26 LAB
ALBUMIN LEVEL: 3.4 G/DL (ref 3.5–5)
ALBUMIN/GLOB SERPL: 1.4 {RATIO} (ref 1.1–1.8)
ALP ISO SERPL-ACNC: 87 U/L (ref 38–126)
ALT SERPLBLD-CCNC: 44 U/L (ref 12–78)
ANION GAP SERPL CALC-SCNC: 11.9 MEQ/L (ref 5–15)
AST SERPL QL: 49 U/L (ref 17–59)
BILIRUBIN,TOTAL: 0.9 MG/DL (ref 0.2–1.3)
BUN SERPL-MCNC: 30 MG/DL (ref 9–20)
CALCIUM SPEC-MCNC: 9 MG/DL (ref 8.4–10.2)
CHLORIDE SPEC-SCNC: 105 MMOL/L (ref 98–107)
CO2 SERPL-SCNC: 29 MMOL/L (ref 22–30)
CREAT BLD-SCNC: 1.2 MG/DL (ref 0.66–1.25)
CREATININE CLEARANCE ESTIMATED: 63 ML/MIN (ref 50–200)
ESTIMATED GLOMERULAR FILT RATE: 58 ML/MIN (ref 60–?)
GFR (AFRICAN AMERICAN): 70 ML/MIN (ref 60–?)
GLOBULIN SER CALC-MCNC: 2.5 G/DL (ref 1.3–3.2)
GLUCOSE BLDC GLUCOMTR-MCNC: 262 MG/DL (ref 70–110)
GLUCOSE: 139 MG/DL (ref 74–100)
POTASSIUM: 3.9 MMOL/L (ref 3.5–5.1)
PROT SERPL-MCNC: 5.9 G/DL (ref 6.3–8.2)
SODIUM SPEC-SCNC: 142 MMOL/L (ref 136–145)

## 2021-11-27 VITALS
SYSTOLIC BLOOD PRESSURE: 127 MMHG | HEART RATE: 96 BPM | TEMPERATURE: 98.5 F | RESPIRATION RATE: 22 BRPM | OXYGEN SATURATION: 90 % | DIASTOLIC BLOOD PRESSURE: 72 MMHG

## 2021-11-27 VITALS
HEART RATE: 77 BPM | SYSTOLIC BLOOD PRESSURE: 108 MMHG | DIASTOLIC BLOOD PRESSURE: 59 MMHG | TEMPERATURE: 97.6 F | OXYGEN SATURATION: 94 % | RESPIRATION RATE: 16 BRPM

## 2021-11-27 VITALS
DIASTOLIC BLOOD PRESSURE: 70 MMHG | RESPIRATION RATE: 20 BRPM | OXYGEN SATURATION: 92 % | HEART RATE: 65 BPM | TEMPERATURE: 97.7 F | SYSTOLIC BLOOD PRESSURE: 139 MMHG

## 2021-11-27 VITALS
HEART RATE: 70 BPM | OXYGEN SATURATION: 91 % | DIASTOLIC BLOOD PRESSURE: 62 MMHG | RESPIRATION RATE: 20 BRPM | SYSTOLIC BLOOD PRESSURE: 112 MMHG | TEMPERATURE: 99.32 F

## 2021-11-27 VITALS
HEART RATE: 72 BPM | DIASTOLIC BLOOD PRESSURE: 44 MMHG | RESPIRATION RATE: 22 BRPM | SYSTOLIC BLOOD PRESSURE: 121 MMHG | TEMPERATURE: 98.06 F | OXYGEN SATURATION: 93 %

## 2021-11-27 VITALS — HEART RATE: 65 BPM | OXYGEN SATURATION: 90 %

## 2021-11-27 VITALS — HEART RATE: 70 BPM

## 2021-11-27 VITALS — HEART RATE: 82 BPM

## 2021-11-27 VITALS
RESPIRATION RATE: 19 BRPM | DIASTOLIC BLOOD PRESSURE: 78 MMHG | HEART RATE: 82 BPM | TEMPERATURE: 97.88 F | OXYGEN SATURATION: 91 % | SYSTOLIC BLOOD PRESSURE: 148 MMHG

## 2021-11-27 VITALS — OXYGEN SATURATION: 90 %

## 2021-11-27 VITALS — OXYGEN SATURATION: 92 %

## 2021-11-27 VITALS — HEART RATE: 81 BPM

## 2021-11-27 VITALS — HEART RATE: 84 BPM

## 2021-11-27 LAB
ALBUMIN LEVEL: 2.9 G/DL (ref 3.5–5)
ALBUMIN/GLOB SERPL: 1.3 {RATIO} (ref 1.1–1.8)
ALP ISO SERPL-ACNC: 72 U/L (ref 38–126)
ALT SERPLBLD-CCNC: 34 U/L (ref 12–78)
ANION GAP SERPL CALC-SCNC: 9.1 MEQ/L (ref 5–15)
AST SERPL QL: 39 U/L (ref 17–59)
BILIRUBIN,TOTAL: 1 MG/DL (ref 0.2–1.3)
BUN SERPL-MCNC: 27 MG/DL (ref 9–20)
CALCIUM SPEC-MCNC: 8.8 MG/DL (ref 8.4–10.2)
CHLORIDE SPEC-SCNC: 106 MMOL/L (ref 98–107)
CO2 SERPL-SCNC: 31 MMOL/L (ref 22–30)
CREAT BLD-SCNC: 1 MG/DL (ref 0.66–1.25)
CREATININE CLEARANCE ESTIMATED: 79 ML/MIN (ref 50–200)
CRP SERPL HS-MCNC: 46.1 MG/L (ref 0–4)
ESTIMATED GLOMERULAR FILT RATE: 72 ML/MIN (ref 60–?)
GFR (AFRICAN AMERICAN): 87 ML/MIN (ref 60–?)
GLOBULIN SER CALC-MCNC: 2.2 G/DL (ref 1.3–3.2)
GLUCOSE BLDC GLUCOMTR-MCNC: 174 MG/DL (ref 70–110)
GLUCOSE BLDC GLUCOMTR-MCNC: 238 MG/DL (ref 70–110)
GLUCOSE BLDC GLUCOMTR-MCNC: 292 MG/DL (ref 70–110)
GLUCOSE: 159 MG/DL (ref 74–100)
POTASSIUM: 5.1 MMOL/L (ref 3.5–5.1)
PROT SERPL-MCNC: 5.1 G/DL (ref 6.3–8.2)
SODIUM SPEC-SCNC: 141 MMOL/L (ref 136–145)

## 2021-11-28 VITALS
HEART RATE: 89 BPM | OXYGEN SATURATION: 94 % | SYSTOLIC BLOOD PRESSURE: 103 MMHG | DIASTOLIC BLOOD PRESSURE: 65 MMHG | RESPIRATION RATE: 21 BRPM | TEMPERATURE: 97.6 F

## 2021-11-28 VITALS
HEART RATE: 78 BPM | TEMPERATURE: 98.5 F | OXYGEN SATURATION: 89 % | DIASTOLIC BLOOD PRESSURE: 54 MMHG | RESPIRATION RATE: 21 BRPM | SYSTOLIC BLOOD PRESSURE: 142 MMHG

## 2021-11-28 VITALS
HEART RATE: 60 BPM | TEMPERATURE: 97.88 F | SYSTOLIC BLOOD PRESSURE: 127 MMHG | RESPIRATION RATE: 18 BRPM | OXYGEN SATURATION: 91 % | DIASTOLIC BLOOD PRESSURE: 66 MMHG

## 2021-11-28 VITALS
HEART RATE: 70 BPM | TEMPERATURE: 98.1 F | OXYGEN SATURATION: 91 % | RESPIRATION RATE: 19 BRPM | DIASTOLIC BLOOD PRESSURE: 56 MMHG | SYSTOLIC BLOOD PRESSURE: 103 MMHG

## 2021-11-28 VITALS
RESPIRATION RATE: 20 BRPM | OXYGEN SATURATION: 92 % | TEMPERATURE: 97.88 F | DIASTOLIC BLOOD PRESSURE: 56 MMHG | HEART RATE: 70 BPM | SYSTOLIC BLOOD PRESSURE: 142 MMHG

## 2021-11-28 VITALS
RESPIRATION RATE: 19 BRPM | TEMPERATURE: 98.6 F | OXYGEN SATURATION: 97 % | HEART RATE: 78 BPM | SYSTOLIC BLOOD PRESSURE: 130 MMHG | DIASTOLIC BLOOD PRESSURE: 58 MMHG

## 2021-11-28 VITALS — HEART RATE: 79 BPM

## 2021-11-28 VITALS — HEART RATE: 70 BPM | OXYGEN SATURATION: 90 %

## 2021-11-28 VITALS — HEART RATE: 80 BPM

## 2021-11-28 VITALS — OXYGEN SATURATION: 90 % | HEART RATE: 80 BPM

## 2021-11-28 LAB
ALBUMIN LEVEL: 3.6 G/DL (ref 3.5–5)
ALBUMIN/GLOB SERPL: 1.3 {RATIO} (ref 1.1–1.8)
ALP ISO SERPL-ACNC: 98 U/L (ref 38–126)
ALT SERPLBLD-CCNC: 43 U/L (ref 12–78)
ANION GAP SERPL CALC-SCNC: 10.2 MEQ/L (ref 5–15)
AST SERPL QL: 39 U/L (ref 17–59)
BILIRUBIN,TOTAL: 0.9 MG/DL (ref 0.2–1.3)
BUN SERPL-MCNC: 27 MG/DL (ref 9–20)
CALCIUM SPEC-MCNC: 9.3 MG/DL (ref 8.4–10.2)
CHLORIDE SPEC-SCNC: 106 MMOL/L (ref 98–107)
CO2 SERPL-SCNC: 29 MMOL/L (ref 22–30)
CREAT BLD-SCNC: 1.1 MG/DL (ref 0.66–1.25)
CREATININE CLEARANCE ESTIMATED: 72 ML/MIN (ref 50–200)
ESTIMATED GLOMERULAR FILT RATE: 64 ML/MIN (ref 60–?)
GFR (AFRICAN AMERICAN): 78 ML/MIN (ref 60–?)
GLOBULIN SER CALC-MCNC: 2.7 G/DL (ref 1.3–3.2)
GLUCOSE BLDC GLUCOMTR-MCNC: 164 MG/DL (ref 70–110)
GLUCOSE BLDC GLUCOMTR-MCNC: 192 MG/DL (ref 70–110)
GLUCOSE BLDC GLUCOMTR-MCNC: 213 MG/DL (ref 70–110)
GLUCOSE BLDC GLUCOMTR-MCNC: 264 MG/DL (ref 70–110)
GLUCOSE: 141 MG/DL (ref 74–100)
POTASSIUM: 4.2 MMOL/L (ref 3.5–5.1)
PROT SERPL-MCNC: 6.3 G/DL (ref 6.3–8.2)
SODIUM SPEC-SCNC: 141 MMOL/L (ref 136–145)

## 2021-11-29 VITALS
OXYGEN SATURATION: 94 % | SYSTOLIC BLOOD PRESSURE: 122 MMHG | RESPIRATION RATE: 16 BRPM | TEMPERATURE: 98.06 F | DIASTOLIC BLOOD PRESSURE: 59 MMHG | HEART RATE: 68 BPM

## 2021-11-29 VITALS — OXYGEN SATURATION: 91 % | HEART RATE: 74 BPM

## 2021-11-29 VITALS
SYSTOLIC BLOOD PRESSURE: 116 MMHG | RESPIRATION RATE: 19 BRPM | HEART RATE: 75 BPM | DIASTOLIC BLOOD PRESSURE: 64 MMHG | TEMPERATURE: 97.88 F | OXYGEN SATURATION: 94 %

## 2021-11-29 VITALS
SYSTOLIC BLOOD PRESSURE: 106 MMHG | HEART RATE: 77 BPM | RESPIRATION RATE: 20 BRPM | OXYGEN SATURATION: 92 % | DIASTOLIC BLOOD PRESSURE: 57 MMHG | TEMPERATURE: 99.1 F

## 2021-11-29 VITALS — HEART RATE: 81 BPM

## 2021-11-29 VITALS
DIASTOLIC BLOOD PRESSURE: 70 MMHG | HEART RATE: 60 BPM | SYSTOLIC BLOOD PRESSURE: 116 MMHG | TEMPERATURE: 97.88 F | RESPIRATION RATE: 19 BRPM | OXYGEN SATURATION: 93 %

## 2021-11-29 VITALS
SYSTOLIC BLOOD PRESSURE: 123 MMHG | HEART RATE: 80 BPM | OXYGEN SATURATION: 93 % | DIASTOLIC BLOOD PRESSURE: 64 MMHG | RESPIRATION RATE: 21 BRPM | TEMPERATURE: 98.06 F

## 2021-11-29 VITALS — OXYGEN SATURATION: 87 %

## 2021-11-29 LAB
ALBUMIN LEVEL: 3.4 G/DL (ref 3.5–5)
ALBUMIN/GLOB SERPL: 1.4 {RATIO} (ref 1.1–1.8)
ALP ISO SERPL-ACNC: 84 U/L (ref 38–126)
ALT SERPLBLD-CCNC: 34 U/L (ref 12–78)
ANION GAP SERPL CALC-SCNC: 10.2 MEQ/L (ref 5–15)
AST SERPL QL: 35 U/L (ref 17–59)
BILIRUBIN,TOTAL: 0.8 MG/DL (ref 0.2–1.3)
BUN SERPL-MCNC: 26 MG/DL (ref 9–20)
CALCIUM SPEC-MCNC: 9.2 MG/DL (ref 8.4–10.2)
CHLORIDE SPEC-SCNC: 104 MMOL/L (ref 98–107)
CO2 SERPL-SCNC: 30 MMOL/L (ref 22–30)
CREAT BLD-SCNC: 1.1 MG/DL (ref 0.66–1.25)
CREATININE CLEARANCE ESTIMATED: 72 ML/MIN (ref 50–200)
ESTIMATED GLOMERULAR FILT RATE: 64 ML/MIN (ref 60–?)
GFR (AFRICAN AMERICAN): 78 ML/MIN (ref 60–?)
GLOBULIN SER CALC-MCNC: 2.4 G/DL (ref 1.3–3.2)
GLUCOSE BLDC GLUCOMTR-MCNC: 147 MG/DL (ref 70–110)
GLUCOSE BLDC GLUCOMTR-MCNC: 206 MG/DL (ref 70–110)
GLUCOSE BLDC GLUCOMTR-MCNC: 223 MG/DL (ref 70–110)
GLUCOSE: 148 MG/DL (ref 74–100)
HCT VFR BLD CALC: 40.3 % (ref 42–52)
HGB BLD-MCNC: 13.1 G/DL (ref 14.1–18)
MCHC RBC-ENTMCNC: 32.6 G/DL (ref 31.8–35.4)
MCV RBC: 88 FL (ref 80–94)
MEAN CORPUSCULAR HEMOGLOBIN: 28.7 PG (ref 27–31.2)
PLATELET # BLD: 390 K/MM3 (ref 142–424)
POTASSIUM: 4.2 MMOL/L (ref 3.5–5.1)
PROT SERPL-MCNC: 5.8 G/DL (ref 6.3–8.2)
RBC # BLD AUTO: 4.58 M/MM3 (ref 4.6–6.2)
SODIUM SPEC-SCNC: 140 MMOL/L (ref 136–145)
WBC # BLD AUTO: 10.2 K/MM3 (ref 4.8–10.8)

## 2021-12-13 ENCOUNTER — HOSPITAL ENCOUNTER (OUTPATIENT)
Age: 81
End: 2021-12-13
Payer: MEDICARE

## 2021-12-13 DIAGNOSIS — R60.0: Primary | ICD-10-CM

## 2021-12-13 DIAGNOSIS — M79.604: ICD-10-CM

## 2021-12-13 PROCEDURE — 93971 EXTREMITY STUDY: CPT

## 2021-12-13 PROCEDURE — 71046 X-RAY EXAM CHEST 2 VIEWS: CPT

## 2022-09-12 ENCOUNTER — HOSPITAL ENCOUNTER (OUTPATIENT)
Age: 82
End: 2022-09-12
Payer: MEDICARE

## 2022-09-12 DIAGNOSIS — R91.8: Primary | ICD-10-CM

## 2022-09-12 PROCEDURE — 71250 CT THORAX DX C-: CPT

## 2022-11-15 ENCOUNTER — HOSPITAL ENCOUNTER (OUTPATIENT)
Age: 82
End: 2022-11-15
Payer: MEDICARE

## 2022-11-15 DIAGNOSIS — R13.19: Primary | ICD-10-CM

## 2022-11-15 PROCEDURE — 92611 MOTION FLUOROSCOPY/SWALLOW: CPT

## 2022-11-15 PROCEDURE — 70371 SPEECH EVALUATION COMPLEX: CPT

## 2022-12-06 ENCOUNTER — HOSPITAL ENCOUNTER (OUTPATIENT)
Age: 82
End: 2022-12-06
Payer: MEDICARE

## 2022-12-06 VITALS — HEART RATE: 62 BPM

## 2022-12-06 DIAGNOSIS — R06.09: Primary | ICD-10-CM

## 2022-12-06 PROCEDURE — 94729 DIFFUSING CAPACITY: CPT

## 2022-12-06 PROCEDURE — 94618 PULMONARY STRESS TESTING: CPT

## 2022-12-06 PROCEDURE — 94060 EVALUATION OF WHEEZING: CPT

## 2022-12-06 PROCEDURE — 94640 AIRWAY INHALATION TREATMENT: CPT

## 2022-12-06 PROCEDURE — 94727 GAS DIL/WSHOT DETER LNG VOL: CPT

## 2023-09-12 ENCOUNTER — HOSPITAL ENCOUNTER (OUTPATIENT)
Age: 83
End: 2023-09-12
Payer: MEDICARE

## 2023-09-12 DIAGNOSIS — R91.8: Primary | ICD-10-CM

## 2023-09-12 PROCEDURE — 71250 CT THORAX DX C-: CPT

## 2023-11-16 ENCOUNTER — HOSPITAL ENCOUNTER (OUTPATIENT)
Age: 83
End: 2023-11-16
Payer: MEDICARE

## 2023-11-16 DIAGNOSIS — C61: Primary | ICD-10-CM

## 2023-11-30 ENCOUNTER — HOSPITAL ENCOUNTER (OUTPATIENT)
Dept: HOSPITAL 22 - RT | Age: 83
End: 2023-11-30
Payer: MEDICARE

## 2023-11-30 DIAGNOSIS — E78.2: ICD-10-CM

## 2023-11-30 DIAGNOSIS — Z87.891: ICD-10-CM

## 2023-11-30 DIAGNOSIS — R06.02: ICD-10-CM

## 2023-11-30 DIAGNOSIS — I11.9: ICD-10-CM

## 2023-11-30 DIAGNOSIS — K21.9: ICD-10-CM

## 2023-11-30 DIAGNOSIS — Z79.84: ICD-10-CM

## 2023-11-30 DIAGNOSIS — I42.9: ICD-10-CM

## 2023-11-30 DIAGNOSIS — E11.9: ICD-10-CM

## 2023-11-30 DIAGNOSIS — I25.118: Primary | ICD-10-CM

## 2023-11-30 DIAGNOSIS — G47.33: ICD-10-CM

## 2023-11-30 DIAGNOSIS — Z95.5: ICD-10-CM

## 2023-11-30 PROCEDURE — 93306 TTE W/DOPPLER COMPLETE: CPT

## 2024-03-22 ENCOUNTER — HOSPITAL ENCOUNTER (OUTPATIENT)
Dept: HOSPITAL 22 - RT | Age: 84
End: 2024-03-22
Payer: MEDICARE

## 2024-03-22 DIAGNOSIS — Z87.891: ICD-10-CM

## 2024-03-22 DIAGNOSIS — I65.23: Primary | ICD-10-CM

## 2024-03-22 PROCEDURE — 93880 EXTRACRANIAL BILAT STUDY: CPT

## 2024-05-17 ENCOUNTER — HOSPITAL ENCOUNTER (OUTPATIENT)
Dept: HOSPITAL 22 - RAD | Age: 84
Discharge: HOME | End: 2024-05-17
Payer: MEDICARE

## 2024-05-17 DIAGNOSIS — I10: ICD-10-CM

## 2024-05-17 DIAGNOSIS — I42.8: ICD-10-CM

## 2024-05-17 DIAGNOSIS — G47.33: ICD-10-CM

## 2024-05-17 DIAGNOSIS — R06.02: ICD-10-CM

## 2024-05-17 DIAGNOSIS — E11.9: ICD-10-CM

## 2024-05-17 DIAGNOSIS — I25.10: ICD-10-CM

## 2024-05-17 DIAGNOSIS — Z95.5: ICD-10-CM

## 2024-05-17 DIAGNOSIS — R07.89: ICD-10-CM

## 2024-05-17 DIAGNOSIS — K21.9: ICD-10-CM

## 2024-05-17 DIAGNOSIS — E78.2: ICD-10-CM

## 2024-05-17 DIAGNOSIS — Z79.84: ICD-10-CM

## 2024-05-17 PROCEDURE — 93017 CV STRESS TEST TRACING ONLY: CPT

## 2024-05-17 PROCEDURE — 78452 HT MUSCLE IMAGE SPECT MULT: CPT

## 2024-05-17 PROCEDURE — A9502 TC99M TETROFOSMIN: HCPCS

## 2024-05-17 PROCEDURE — 93018 CV STRESS TEST I&R ONLY: CPT

## 2024-05-17 RX ADMIN — SODIUM CHLORIDE, PRESERVATIVE FREE 10 ML: 5 INJECTION INTRAVENOUS at 12:00

## 2024-05-17 RX ADMIN — SODIUM CHLORIDE, PRESERVATIVE FREE 10 ML: 5 INJECTION INTRAVENOUS at 13:28

## 2024-07-11 ENCOUNTER — HOSPITAL ENCOUNTER (OUTPATIENT)
Age: 84
Discharge: HOME | End: 2024-07-11
Payer: MEDICARE

## 2024-07-11 VITALS
HEART RATE: 62 BPM | OXYGEN SATURATION: 97 % | RESPIRATION RATE: 17 BRPM | DIASTOLIC BLOOD PRESSURE: 67 MMHG | SYSTOLIC BLOOD PRESSURE: 140 MMHG | TEMPERATURE: 98.24 F

## 2024-07-11 VITALS — HEART RATE: 60 BPM | OXYGEN SATURATION: 94 % | SYSTOLIC BLOOD PRESSURE: 136 MMHG | DIASTOLIC BLOOD PRESSURE: 66 MMHG

## 2024-07-11 VITALS
DIASTOLIC BLOOD PRESSURE: 69 MMHG | RESPIRATION RATE: 16 BRPM | HEART RATE: 59 BPM | OXYGEN SATURATION: 95 % | SYSTOLIC BLOOD PRESSURE: 129 MMHG

## 2024-07-11 VITALS
OXYGEN SATURATION: 94 % | SYSTOLIC BLOOD PRESSURE: 127 MMHG | RESPIRATION RATE: 15 BRPM | HEART RATE: 55 BPM | DIASTOLIC BLOOD PRESSURE: 72 MMHG

## 2024-07-11 VITALS
HEART RATE: 51 BPM | OXYGEN SATURATION: 95 % | RESPIRATION RATE: 17 BRPM | DIASTOLIC BLOOD PRESSURE: 72 MMHG | SYSTOLIC BLOOD PRESSURE: 128 MMHG

## 2024-07-11 VITALS
SYSTOLIC BLOOD PRESSURE: 139 MMHG | DIASTOLIC BLOOD PRESSURE: 54 MMHG | RESPIRATION RATE: 17 BRPM | HEART RATE: 61 BPM | OXYGEN SATURATION: 98 %

## 2024-07-11 VITALS
SYSTOLIC BLOOD PRESSURE: 123 MMHG | RESPIRATION RATE: 18 BRPM | HEART RATE: 52 BPM | DIASTOLIC BLOOD PRESSURE: 64 MMHG | OXYGEN SATURATION: 100 %

## 2024-07-11 VITALS
RESPIRATION RATE: 17 BRPM | SYSTOLIC BLOOD PRESSURE: 126 MMHG | DIASTOLIC BLOOD PRESSURE: 68 MMHG | HEART RATE: 89 BPM | TEMPERATURE: 98.2 F

## 2024-07-11 VITALS
OXYGEN SATURATION: 91 % | DIASTOLIC BLOOD PRESSURE: 69 MMHG | SYSTOLIC BLOOD PRESSURE: 138 MMHG | RESPIRATION RATE: 17 BRPM | HEART RATE: 57 BPM

## 2024-07-11 VITALS
RESPIRATION RATE: 15 BRPM | HEART RATE: 60 BPM | DIASTOLIC BLOOD PRESSURE: 67 MMHG | SYSTOLIC BLOOD PRESSURE: 130 MMHG | OXYGEN SATURATION: 97 %

## 2024-07-11 VITALS
RESPIRATION RATE: 15 BRPM | HEART RATE: 61 BPM | DIASTOLIC BLOOD PRESSURE: 73 MMHG | OXYGEN SATURATION: 96 % | SYSTOLIC BLOOD PRESSURE: 138 MMHG

## 2024-07-11 VITALS
DIASTOLIC BLOOD PRESSURE: 67 MMHG | OXYGEN SATURATION: 95 % | SYSTOLIC BLOOD PRESSURE: 136 MMHG | RESPIRATION RATE: 16 BRPM | HEART RATE: 54 BPM

## 2024-07-11 VITALS
HEART RATE: 60 BPM | RESPIRATION RATE: 16 BRPM | OXYGEN SATURATION: 91 % | SYSTOLIC BLOOD PRESSURE: 137 MMHG | DIASTOLIC BLOOD PRESSURE: 67 MMHG

## 2024-07-11 VITALS
OXYGEN SATURATION: 99 % | RESPIRATION RATE: 16 BRPM | DIASTOLIC BLOOD PRESSURE: 81 MMHG | HEART RATE: 55 BPM | SYSTOLIC BLOOD PRESSURE: 139 MMHG

## 2024-07-11 VITALS — BODY MASS INDEX: 65.7 KG/M2

## 2024-07-11 DIAGNOSIS — R06.02: ICD-10-CM

## 2024-07-11 DIAGNOSIS — Z79.84: ICD-10-CM

## 2024-07-11 DIAGNOSIS — I42.8: ICD-10-CM

## 2024-07-11 DIAGNOSIS — Z79.899: ICD-10-CM

## 2024-07-11 DIAGNOSIS — I25.118: ICD-10-CM

## 2024-07-11 DIAGNOSIS — R93.1: Primary | ICD-10-CM

## 2024-07-11 DIAGNOSIS — I10: ICD-10-CM

## 2024-07-11 DIAGNOSIS — E11.9: ICD-10-CM

## 2024-07-11 LAB
ANION GAP SERPL CALC-SCNC: 9.4 MEQ/L (ref 5–15)
BUN SERPL-MCNC: 19 MG/DL (ref 9–20)
CALCIUM SPEC-MCNC: 9.5 MG/DL (ref 8.4–10.2)
CHLORIDE SPEC-SCNC: 111 MMOL/L (ref 98–107)
CO2 SERPL-SCNC: 28 MMOL/L (ref 22–30)
CREAT BLD-SCNC: 1.2 MG/DL (ref 0.66–1.25)
CREATININE CLEARANCE ESTIMATED: 50 ML/MIN (ref 50–200)
ESTIMATED GLOMERULAR FILT RATE: 58 ML/MIN (ref 60–?)
GFR (AFRICAN AMERICAN): 70 ML/MIN (ref 60–?)
GLUCOSE: 166 MG/DL (ref 74–100)
HCT VFR BLD CALC: 45.3 % (ref 42–52)
HGB BLD-MCNC: 14.4 G/DL (ref 14.1–18)
MCHC RBC-ENTMCNC: 31.8 G/DL (ref 31.8–35.4)
MCV RBC: 95.6 FL (ref 80–94)
MEAN CORPUSCULAR HEMOGLOBIN: 30.4 PG (ref 27–31.2)
PLATELET # BLD: 165 K/MM3 (ref 142–424)
POTASSIUM: 4.4 MMOL/L (ref 3.5–5.1)
RBC # BLD AUTO: 4.74 M/MM3 (ref 4.6–6.2)
SODIUM SPEC-SCNC: 144 MMOL/L (ref 136–145)
WBC # BLD AUTO: 7 K/MM3 (ref 4.8–10.8)

## 2024-07-11 PROCEDURE — C1725 CATH, TRANSLUMIN NON-LASER: HCPCS

## 2024-07-11 PROCEDURE — 93458 L HRT ARTERY/VENTRICLE ANGIO: CPT

## 2024-07-11 PROCEDURE — 80048 BASIC METABOLIC PNL TOTAL CA: CPT

## 2024-07-11 PROCEDURE — 99152 MOD SED SAME PHYS/QHP 5/>YRS: CPT

## 2024-07-11 PROCEDURE — C1769 GUIDE WIRE: HCPCS

## 2024-07-11 PROCEDURE — 85025 COMPLETE CBC W/AUTO DIFF WBC: CPT

## 2025-07-01 ENCOUNTER — HOSPITAL ENCOUNTER (EMERGENCY)
Age: 85
Discharge: HOME | End: 2025-07-01
Payer: MEDICARE

## 2025-07-01 VITALS
SYSTOLIC BLOOD PRESSURE: 146 MMHG | OXYGEN SATURATION: 90 % | DIASTOLIC BLOOD PRESSURE: 67 MMHG | RESPIRATION RATE: 22 BRPM | HEART RATE: 80 BPM

## 2025-07-01 VITALS
DIASTOLIC BLOOD PRESSURE: 60 MMHG | TEMPERATURE: 98.6 F | HEART RATE: 79 BPM | OXYGEN SATURATION: 98 % | RESPIRATION RATE: 21 BRPM | SYSTOLIC BLOOD PRESSURE: 125 MMHG

## 2025-07-01 VITALS
OXYGEN SATURATION: 95 % | HEART RATE: 77 BPM | TEMPERATURE: 98.96 F | SYSTOLIC BLOOD PRESSURE: 156 MMHG | DIASTOLIC BLOOD PRESSURE: 83 MMHG | RESPIRATION RATE: 12 BRPM

## 2025-07-01 VITALS — BODY MASS INDEX: 29.4 KG/M2

## 2025-07-01 DIAGNOSIS — R07.2: Primary | ICD-10-CM

## 2025-07-01 DIAGNOSIS — E78.5: ICD-10-CM

## 2025-07-01 DIAGNOSIS — I25.119: ICD-10-CM

## 2025-07-01 DIAGNOSIS — Z95.5: ICD-10-CM

## 2025-07-01 DIAGNOSIS — I10: ICD-10-CM

## 2025-07-01 DIAGNOSIS — K21.9: ICD-10-CM

## 2025-07-01 LAB
ALBUMIN LEVEL: 4 G/DL (ref 3.5–5)
ALBUMIN/GLOB SERPL: 1.5 {RATIO} (ref 1.1–1.8)
ALP ISO SERPL-ACNC: 94 U/L (ref 38–126)
ALT SERPLBLD-CCNC: 19 U/L (ref 12–78)
ANION GAP SERPL CALC-SCNC: 14.6 MEQ/L (ref 5–15)
AST SERPL QL: 27 U/L (ref 17–59)
BASOPHILS # BLD AUTO: 0 K/MM3 (ref 0–0.2)
BASOPHILS NFR BLD AUTO: 0.3 % (ref 0.1–2)
BILIRUBIN,TOTAL: 1.1 MG/DL (ref 0.2–1.3)
BUN SERPL-MCNC: 21 MG/DL (ref 9–20)
CALCIUM SPEC-MCNC: 8.6 MG/DL (ref 8.4–10.2)
CHLORIDE SPEC-SCNC: 105 MMOL/L (ref 98–107)
CO2 SERPL-SCNC: 24 MMOL/L (ref 22–30)
CREAT BLD-SCNC: 1.1 MG/DL (ref 0.66–1.25)
CREATININE CLEARANCE ESTIMATED: 68 ML/MIN (ref 50–200)
D-DIMER: 0.44 UG/ML (ref 0–0.5)
DEPRECATED RDW RBC AUTO: 14.9 % (ref 11.5–17.5)
EOSINOPHIL # BLD AUTO: 0.5 KMM3 (ref 0–0.4)
EOSINOPHIL NFR BLD AUTO: 6.4 % (ref 0.1–12)
ESTIMATED GLOMERULAR FILT RATE: 64 ML/MIN (ref 60–?)
GFR (AFRICAN AMERICAN): 77 ML/MIN (ref 60–?)
GLOBULIN SER CALC-MCNC: 2.6 G/DL (ref 1.3–3.2)
GLUCOSE: 192 MG/DL (ref 74–100)
HCT VFR BLD AUTO: 36.8 % (ref 42–52)
HGB BLD-MCNC: 12 G/DL (ref 14.1–18)
IMM GRANULOCYTES # BLD AUTO: 0.03 10^3UL
IMM GRANULOCYTES NFR BLD AUTO: 0.4 %
LACTATE SERPL-SCNC: 1.3 MMOL/L (ref 0.7–2.1)
LIPASE: 178 U/L (ref 23–300)
LYMPHOCYTES # SPEC AUTO: 1.3 K/MM3 (ref 0.7–4.5)
LYMPHOCYTES %: 16.5 % (ref 10–50)
MCH RBC QN AUTO: 29.1 PG (ref 27–31.2)
MCHC RBC-ENTMCNC: 32.6 G/DL (ref 31.8–35.4)
MCV RBC: 89.3 FL (ref 80–94)
MONOCYTES # BLD AUTO: 1 K/MM3 (ref 0.1–1)
MONOCYTES NFR BLD AUTO: 12 % (ref 1.7–9.3)
NEUTROPHILS # BLD AUTO: 5.2 K/MM3 (ref 1.8–7.8)
NEUTROPHILS NFR BLD AUTO: 64.4 % (ref 37–80)
NUCLEATED RED BLOOD CELLS #: 0 10^3/UL
NUCLEATED RED BLOOD CELLS %: 0 %
PLATELET # BLD: 141 K/MM3 (ref 142–424)
PMV BLD AUTO: 10.8 FL (ref 7.4–10.4)
POTASSIUM: 4.6 MMOL/L (ref 3.5–5.1)
PROT SERPL-MCNC: 6.6 G/DL (ref 6.3–8.2)
RBC # BLD AUTO: 4.12 M/MM3 (ref 4.6–6.2)
RED CELL DISTRIBUTION WIDTH-SD: 48.6 FL
SODIUM SPEC-SCNC: 139 MMOL/L (ref 136–145)
TROPONIN I: < 0.01 NG/ML (ref 0–0.03)
WBC # BLD AUTO: 8 K/MM3 (ref 4.8–10.8)
WBC NRBC COR # BLD: (no result) K/MM3 (ref 4.8–10.8)

## 2025-07-01 PROCEDURE — 80053 COMPREHEN METABOLIC PANEL: CPT

## 2025-07-01 PROCEDURE — 96375 TX/PRO/DX INJ NEW DRUG ADDON: CPT

## 2025-07-01 PROCEDURE — 83605 ASSAY OF LACTIC ACID: CPT

## 2025-07-01 PROCEDURE — 99285 EMERGENCY DEPT VISIT HI MDM: CPT

## 2025-07-01 PROCEDURE — 85378 FIBRIN DEGRADE SEMIQUANT: CPT

## 2025-07-01 PROCEDURE — 93005 ELECTROCARDIOGRAM TRACING: CPT

## 2025-07-01 PROCEDURE — 71045 X-RAY EXAM CHEST 1 VIEW: CPT

## 2025-07-01 PROCEDURE — 83690 ASSAY OF LIPASE: CPT

## 2025-07-01 PROCEDURE — 96374 THER/PROPH/DIAG INJ IV PUSH: CPT

## 2025-07-01 PROCEDURE — 85025 COMPLETE CBC W/AUTO DIFF WBC: CPT

## 2025-07-01 PROCEDURE — 84484 ASSAY OF TROPONIN QUANT: CPT

## 2025-07-14 ENCOUNTER — HOSPITAL ENCOUNTER (OUTPATIENT)
Dept: HOSPITAL 22 - RAD | Age: 85
Discharge: HOME | End: 2025-07-14
Payer: MEDICARE

## 2025-07-14 DIAGNOSIS — J18.9: ICD-10-CM

## 2025-07-14 DIAGNOSIS — R91.8: Primary | ICD-10-CM

## 2025-07-14 PROCEDURE — 71046 X-RAY EXAM CHEST 2 VIEWS: CPT
